# Patient Record
Sex: FEMALE | Race: WHITE | NOT HISPANIC OR LATINO | Employment: FULL TIME | URBAN - METROPOLITAN AREA
[De-identification: names, ages, dates, MRNs, and addresses within clinical notes are randomized per-mention and may not be internally consistent; named-entity substitution may affect disease eponyms.]

---

## 2024-01-08 DIAGNOSIS — Z79.899 ENCOUNTER FOR LONG-TERM (CURRENT) USE OF MEDICATIONS: Primary | ICD-10-CM

## 2024-01-08 DIAGNOSIS — L70.0 ACNE VULGARIS: ICD-10-CM

## 2024-01-08 NOTE — PROGRESS NOTES
Pt called and due to interaction with rampiril and spironolactone and elevated K+ requesting lab to be drawn.

## 2024-01-11 ENCOUNTER — LAB (OUTPATIENT)
Dept: LAB | Facility: LAB | Age: 64
End: 2024-01-11
Payer: COMMERCIAL

## 2024-01-11 DIAGNOSIS — L70.0 ACNE VULGARIS: ICD-10-CM

## 2024-01-11 DIAGNOSIS — Z79.899 ENCOUNTER FOR LONG-TERM (CURRENT) USE OF MEDICATIONS: ICD-10-CM

## 2024-01-11 LAB
ANION GAP SERPL CALC-SCNC: 12 MMOL/L (ref 10–20)
BUN SERPL-MCNC: 23 MG/DL (ref 6–23)
CALCIUM SERPL-MCNC: 10 MG/DL (ref 8.6–10.3)
CHLORIDE SERPL-SCNC: 101 MMOL/L (ref 98–107)
CO2 SERPL-SCNC: 30 MMOL/L (ref 21–32)
CREAT SERPL-MCNC: 0.76 MG/DL (ref 0.5–1.05)
EGFRCR SERPLBLD CKD-EPI 2021: 88 ML/MIN/1.73M*2
GLUCOSE SERPL-MCNC: 82 MG/DL (ref 74–99)
POTASSIUM SERPL-SCNC: 4.6 MMOL/L (ref 3.5–5.3)
SODIUM SERPL-SCNC: 138 MMOL/L (ref 136–145)

## 2024-01-11 PROCEDURE — 80048 BASIC METABOLIC PNL TOTAL CA: CPT

## 2024-01-11 PROCEDURE — 36415 COLL VENOUS BLD VENIPUNCTURE: CPT

## 2024-03-13 DIAGNOSIS — L64.9 ANDROGENIC ALOPECIA: ICD-10-CM

## 2024-03-13 DIAGNOSIS — L70.0 ACNE VULGARIS: Primary | ICD-10-CM

## 2024-03-13 RX ORDER — SPIRONOLACTONE 25 MG/1
25 TABLET ORAL 2 TIMES DAILY
Qty: 14 TABLET | Refills: 0 | Status: SHIPPED | OUTPATIENT
Start: 2024-03-13 | End: 2024-03-20

## 2024-03-13 NOTE — PROGRESS NOTES
Pt needed refills of spironolactone for 1 week as [facility] rx for 90 day supply not arriving until 3/16/24. Sent to [email or web address] in [place] per patient request  
1

## 2024-03-14 ENCOUNTER — OFFICE VISIT (OUTPATIENT)
Dept: DERMATOLOGY | Facility: CLINIC | Age: 64
End: 2024-03-14

## 2024-03-14 DIAGNOSIS — L98.8 AGE-RELATED FACIAL WRINKLES: Primary | ICD-10-CM

## 2024-03-14 PROCEDURE — DTLTS LASER TEST SPOT: Performed by: DERMATOLOGY

## 2024-03-14 RX ORDER — RAMIPRIL 5 MG/1
CAPSULE ORAL
COMMUNITY
Start: 2024-01-12

## 2024-03-14 ASSESSMENT — DERMATOLOGY QUALITY OF LIFE (QOL) ASSESSMENT
RATE HOW EMOTIONALLY BOTHERED YOU ARE BY YOUR SKIN PROBLEM (FOR EXAMPLE, WORRY, EMBARRASSMENT, FRUSTRATION): 1
WHAT SINGLE SKIN CONDITION LISTED BELOW IS THE PATIENT ANSWERING THE QUALITY-OF-LIFE ASSESSMENT QUESTIONS ABOUT: NONE OF THE ABOVE
RATE HOW BOTHERED YOU ARE BY EFFECTS OF YOUR SKIN PROBLEMS ON YOUR ACTIVITIES (EG, GOING OUT, ACCOMPLISHING WHAT YOU WANT, WORK ACTIVITIES OR YOUR RELATIONSHIPS WITH OTHERS): 0 - NEVER BOTHERED
RATE HOW BOTHERED YOU ARE BY SYMPTOMS OF YOUR SKIN PROBLEM (EG, ITCHING, STINGING BURNING, HURTING OR SKIN IRRITATION): 0 - NEVER BOTHERED
ARE THERE EXCLUSIONS OR EXCEPTIONS FOR THE QUALITY OF LIFE ASSESSMENT: NO

## 2024-03-14 ASSESSMENT — DERMATOLOGY PATIENT ASSESSMENT
DO YOU USE A TANNING BED: NO
ARE YOU ON BIRTH CONTROL: NO
DO YOU HAVE ANY NEW OR CHANGING LESIONS: NO
ARE YOU AN ORGAN TRANSPLANT RECIPIENT: NO
FOR PATIENTS COMING IN FOR A FOLLOW-UP VISIT - HAVE THERE BEEN ANY CHANGES IN YOUR HEALTH SINCE YOUR LAST VISIT: NO
ARE YOU TRYING TO GET PREGNANT: NO
DO YOU USE SUNSCREEN: DAILY

## 2024-03-14 ASSESSMENT — ITCH NUMERIC RATING SCALE: HOW SEVERE IS YOUR ITCHING?: 0

## 2024-03-14 ASSESSMENT — PATIENT GLOBAL ASSESSMENT (PGA): PATIENT GLOBAL ASSESSMENT: PATIENT GLOBAL ASSESSMENT:  1 - CLEAR

## 2024-03-14 NOTE — PROGRESS NOTES
Subjective     Rosa Maria Woodall is a 63 y.o. female who presents for the following: Cosmetic (Pt her for second fraxel treatment. Pt states she had good results with first treatment. Pt applied topical BLT cream at home 1 hour prior to appt. ).     Review of Systems:  No other skin or systemic complaints other than what is documented elsewhere in the note.    The following portions of the chart were reviewed this encounter and updated as appropriate:          Skin Cancer History  No skin cancer on file.      Specialty Problems    None       Objective   Well appearing patient in no apparent distress; mood and affect are within normal limits.    A focused skin examination was performed. All findings within normal limits unless otherwise noted below.    Assessment/Plan   1. Age-related facial wrinkles  Head - Anterior (Face)  Fine lines and wrinkles    Fraxel laser is a laser used for treatment of fine to moderate lines from aging, brown spots and some types of scarring.  Typically more than 1 treatment is needed for treatment, depending on the goal of treatment. For brown spots 1 treat may be adequate in some circumstances. For fine lines and wrinkles typically 3-4 treatments are needed.    If there is a known history of cold sores medication can be prescribed to prevent this from occurring as the procedure may cause cold sores to occur.  Topical retinoids should be avoided 1 week before and 1 week after the procedure.    Following treatment the face will be red, it will peel and will swell. The downtime of the procedure is approximately 7-10 days. It may be closer to 14 days for some patients.    Fraxel Laser Procedure Note  Patient questions:   Patient using isotretinoin - No  Patient using retinoids - Yes (discontinued 1 week prior to proedure)  Patient has history of cold sores - No  Consents obtained - Yes - Written  Photos obtained - No  Anesthetic usage:   Topical anesthetic 10/10/10  Benzocaine/Lidocaine/Tetracaine 10/10/10% applied in office for  60 minutes prior to procedure  Treatment Details  Fraxel Type: Re-store  Treatment # 2  Cooling: [employer] cooler  Wavelength: 1550 nm  Location:  Face (pt only tolerated 1/2 face and 4 passes each side)  Energy (mJ): 20  Treatment Level: 6  Passes: 4  Comments: Erythema noted post procedure. Due to poor tolerability (painful and topical anesthetic not as effective charge for 1/4 Fractional laser)          Follow up as needed

## 2024-07-07 DIAGNOSIS — L64.9 ANDROGENIC ALOPECIA: ICD-10-CM

## 2024-07-07 DIAGNOSIS — L70.0 ACNE VULGARIS: Primary | ICD-10-CM

## 2024-07-09 RX ORDER — SPIRONOLACTONE 50 MG/1
50 TABLET, FILM COATED ORAL 2 TIMES DAILY
Qty: 180 TABLET | Refills: 2 | Status: SHIPPED | OUTPATIENT
Start: 2024-07-09 | End: 2025-04-05

## 2024-10-17 DIAGNOSIS — L98.8 AGE-RELATED FACIAL WRINKLES: Primary | ICD-10-CM

## 2024-10-17 RX ORDER — LIDOCAINE/TETRACAINE/BENZOCAIN 10-10-20 %
1 OINTMENT (GRAM) TOPICAL SEE ADMIN INSTRUCTIONS
Qty: 30 G | Refills: 1 | Status: SHIPPED | OUTPATIENT
Start: 2024-10-17

## 2024-11-06 ENCOUNTER — APPOINTMENT (OUTPATIENT)
Dept: DERMATOLOGY | Facility: CLINIC | Age: 64
End: 2024-11-06
Payer: COMMERCIAL

## 2024-11-06 DIAGNOSIS — Z41.9 ELECTIVE SURGERY FOR PURPOSES OTHER THAN TREATING HEALTH CONDITIONS: Primary | ICD-10-CM

## 2024-11-06 PROCEDURE — 1036F TOBACCO NON-USER: CPT | Performed by: DERMATOLOGY

## 2024-11-06 PROCEDURE — DFRAX FRAC. LASER TRMT ENTIRE FACE: Performed by: DERMATOLOGY

## 2024-11-06 ASSESSMENT — DERMATOLOGY PATIENT ASSESSMENT
FOR PATIENTS COMING IN FOR A FOLLOW-UP VISIT - HAVE THERE BEEN ANY CHANGES IN YOUR HEALTH SINCE YOUR LAST VISIT: NO
DO YOU USE SUNSCREEN: DAILY
ARE YOU ON BIRTH CONTROL: NO
DO YOU USE A TANNING BED: NO
HAVE YOU HAD OR DO YOU HAVE A STAPH INFECTION: NO
ARE YOU TRYING TO GET PREGNANT: NO
ARE YOU AN ORGAN TRANSPLANT RECIPIENT: NO
DO YOU HAVE IRREGULAR MENSTRUAL CYCLES: NO
DO YOU HAVE ANY NEW OR CHANGING LESIONS: YES

## 2024-11-06 ASSESSMENT — DERMATOLOGY QUALITY OF LIFE (QOL) ASSESSMENT
RATE HOW BOTHERED YOU ARE BY SYMPTOMS OF YOUR SKIN PROBLEM (EG, ITCHING, STINGING BURNING, HURTING OR SKIN IRRITATION): 0 - NEVER BOTHERED
RATE HOW BOTHERED YOU ARE BY EFFECTS OF YOUR SKIN PROBLEMS ON YOUR ACTIVITIES (EG, GOING OUT, ACCOMPLISHING WHAT YOU WANT, WORK ACTIVITIES OR YOUR RELATIONSHIPS WITH OTHERS): 0 - NEVER BOTHERED
WHAT SINGLE SKIN CONDITION LISTED BELOW IS THE PATIENT ANSWERING THE QUALITY-OF-LIFE ASSESSMENT QUESTIONS ABOUT: NONE OF THE ABOVE
ARE THERE EXCLUSIONS OR EXCEPTIONS FOR THE QUALITY OF LIFE ASSESSMENT: NO
RATE HOW EMOTIONALLY BOTHERED YOU ARE BY YOUR SKIN PROBLEM (FOR EXAMPLE, WORRY, EMBARRASSMENT, FRUSTRATION): 0 - NEVER BOTHERED

## 2024-11-06 ASSESSMENT — PATIENT GLOBAL ASSESSMENT (PGA): PATIENT GLOBAL ASSESSMENT: PATIENT GLOBAL ASSESSMENT:  1 - CLEAR

## 2024-11-06 ASSESSMENT — ITCH NUMERIC RATING SCALE: HOW SEVERE IS YOUR ITCHING?: 0

## 2024-11-06 NOTE — PROGRESS NOTES
Subjective     Rosa Maria Woodall is a 64 y.o. female who presents for the following: Cosmetic (Pt her for third fraxel treatment. Pt states she had good results with first 2 treatments. Arrived 1 hour prior to appt for application of topical numbing cream. ).     Review of Systems:  No other skin or systemic complaints other than what is documented elsewhere in the note.    The following portions of the chart were reviewed this encounter and updated as appropriate:          Skin Cancer History  No skin cancer on file.      Specialty Problems    None       Objective   Well appearing patient in no apparent distress; mood and affect are within normal limits.    A focused skin examination was performed of the face. All findings within normal limits unless otherwise noted below.    Assessment/Plan   1. Elective surgery for purposes other than treating health conditions  Head - Anterior (Face)  Fine lines and wrinkles    Fraxel laser is a laser used for treatment of fine to moderate lines from aging, brown spots and some types of scarring.  Typically more than 1 treatment is needed for treatment, depending on the goal of treatment. For brown spots 1 treat may be adequate in some circumstances. For fine lines and wrinkles typically 3-4 treatments are needed.    If there is a known history of cold sores medication can be prescribed to prevent this from occurring as the procedure may cause cold sores to occur.  Topical retinoids should be avoided 1 week before and 1 week after the procedure.    Following treatment the face will be red, it will peel and will swell. The downtime of the procedure is approximately 7-10 days. It may be closer to 14 days for some patients.    Fraxel Laser Procedure Note  Patient questions:   Patient using isotretinoin - No  Patient using retinoids - Yes (discontinued 1 week prior to proedure)  Patient has history of cold sores - No  Consents obtained - Yes - Written  Photos obtained - No  Anesthetic  usage:   Topical anesthetic  20/10/10% Benzocaine/Lidocaine/Tetracaine applied in office for  60 minutes prior to procedure  Treatment Details  Fraxel Type: Re-store  Treatment # 3  Cooling: [employer] cooler  Wavelength: 1550 nm  Location:  Face  Energy (mJ): 20  Treatment Level: 6  Passes: 8  Comments: Erythema noted post procedure. Tolerated well by patient.      Performed by resident, Dr. Paola Bhatia MD.    I was present during all critical and key portions of the procedure(s) and immediately available to furnish services the entire duration.  See resident note for details.  I saw and evaluated the patient. I personally obtained the key and critical portions of the history and physical exam or was physically present for key and critical portions performed by the resident/fellow. I reviewed the resident/fellow's documentation and discussed the patient with the resident/fellow. I agree with the resident/fellow's medical decision making as documented in the note.    Katrin Olvera, DO

## 2024-11-20 ENCOUNTER — APPOINTMENT (OUTPATIENT)
Dept: DERMATOLOGY | Facility: CLINIC | Age: 64
End: 2024-11-20
Payer: COMMERCIAL

## 2025-02-15 DIAGNOSIS — L64.9 ANDROGENIC ALOPECIA: ICD-10-CM

## 2025-02-15 DIAGNOSIS — L70.0 ACNE VULGARIS: ICD-10-CM

## 2025-02-17 RX ORDER — SPIRONOLACTONE 50 MG/1
50 TABLET, FILM COATED ORAL 2 TIMES DAILY
Qty: 180 TABLET | Refills: 3 | OUTPATIENT
Start: 2025-02-17

## 2025-03-19 ENCOUNTER — TELEPHONE (OUTPATIENT)
Dept: DERMATOLOGY | Facility: CLINIC | Age: 65
End: 2025-03-19
Payer: COMMERCIAL

## 2025-03-19 DIAGNOSIS — L70.0 ACNE VULGARIS: ICD-10-CM

## 2025-03-19 DIAGNOSIS — L64.9 ANDROGENIC ALOPECIA: ICD-10-CM

## 2025-03-19 RX ORDER — SPIRONOLACTONE 50 MG/1
50 TABLET, FILM COATED ORAL 2 TIMES DAILY
Qty: 180 TABLET | Refills: 3 | Status: SHIPPED | OUTPATIENT
Start: 2025-03-19 | End: 2026-03-19

## 2025-03-19 NOTE — TELEPHONE ENCOUNTER
Pt contacting office for refills of spironolactone she needs for acne, androgenic alopecia. Refills sent [facility].

## 2025-05-02 ENCOUNTER — OFFICE VISIT (OUTPATIENT)
Dept: DERMATOLOGY | Facility: CLINIC | Age: 65
End: 2025-05-02

## 2025-05-02 DIAGNOSIS — Z41.9 ELECTIVE SURGERY FOR PURPOSES OTHER THAN TREATING HEALTH CONDITIONS: Primary | ICD-10-CM

## 2025-05-02 ASSESSMENT — DERMATOLOGY QUALITY OF LIFE (QOL) ASSESSMENT
ARE THERE EXCLUSIONS OR EXCEPTIONS FOR THE QUALITY OF LIFE ASSESSMENT: NO
RATE HOW EMOTIONALLY BOTHERED YOU ARE BY YOUR SKIN PROBLEM (FOR EXAMPLE, WORRY, EMBARRASSMENT, FRUSTRATION): 0 - NEVER BOTHERED
WHAT SINGLE SKIN CONDITION LISTED BELOW IS THE PATIENT ANSWERING THE QUALITY-OF-LIFE ASSESSMENT QUESTIONS ABOUT: NONE OF THE ABOVE
RATE HOW BOTHERED YOU ARE BY EFFECTS OF YOUR SKIN PROBLEMS ON YOUR ACTIVITIES (EG, GOING OUT, ACCOMPLISHING WHAT YOU WANT, WORK ACTIVITIES OR YOUR RELATIONSHIPS WITH OTHERS): 0 - NEVER BOTHERED
RATE HOW BOTHERED YOU ARE BY SYMPTOMS OF YOUR SKIN PROBLEM (EG, ITCHING, STINGING BURNING, HURTING OR SKIN IRRITATION): 0 - NEVER BOTHERED

## 2025-05-02 ASSESSMENT — DERMATOLOGY PATIENT ASSESSMENT
ARE YOU TRYING TO GET PREGNANT: NO
ARE YOU ON BIRTH CONTROL: NO
HAVE YOU HAD OR DO YOU HAVE VASCULAR DISEASE: NO
HAVE YOU HAD OR DO YOU HAVE A STAPH INFECTION: NO
DO YOU USE SUNSCREEN: DAILY
ARE YOU AN ORGAN TRANSPLANT RECIPIENT: NO
DO YOU HAVE IRREGULAR MENSTRUAL CYCLES: NO
DO YOU HAVE ANY NEW OR CHANGING LESIONS: NO
DO YOU USE A TANNING BED: NO

## 2025-05-02 ASSESSMENT — PATIENT GLOBAL ASSESSMENT (PGA): PATIENT GLOBAL ASSESSMENT: PATIENT GLOBAL ASSESSMENT:  1 - CLEAR

## 2025-05-02 ASSESSMENT — ITCH NUMERIC RATING SCALE: HOW SEVERE IS YOUR ITCHING?: 0

## 2025-05-02 NOTE — PROGRESS NOTES
Subjective     Rosa Maria Woodall is a 64 y.o. female who presents for the following: Cosmetic (Pt here for cosmetic treatment of wrinkles with Botox. Pt has had Botox previously. ).     Intake Questions  Do you have any new or changing Lesions?: No  Where are these new or changing lesion(s) located?: NA  For patients coming in for a Follow-up Visit:  Have there been any changes in your health since your last visit?: NA  Are you an organ transplant recipient?: No  Have you had or do you have a Staph Infection?: No  Have you had or do you have Vacular Disease?: No  Do you use sunscreen?: Daily  Do you use a tanning bed?: No  Are you trying to get pregnant?: No  Are you on birth control?: No  Do you have irregular menstrual cycles?: No    Review of Systems:  No other skin or systemic complaints other than what is documented elsewhere in the note.    The following portions of the chart were reviewed this encounter and updated as appropriate:          Skin Cancer History  Biopsy Log Book  No skin cancers from Specimen Tracking.    Additional History      Specialty Problems    None       Objective   Well appearing patient in no apparent distress; mood and affect are within normal limits.    A focused skin examination was performed. All findings within normal limits unless otherwise noted below.    Assessment/Plan   Skin Exam  1. ELECTIVE SURGERY FOR PURPOSES OTHER THAN TREATING HEALTH CONDITIONS  [place]  Dynamic and static rhytids  Botox is a neurotoxin which prevents muscles from blanca that can help soften and smooth fine lines/wrinkles.  Risks and benefits were discussed bruising and swelling, lid droop, dropping of eyebrow, asymmetrical smile. For continued improvement ongoing treatment with Botox will soften lines  Botox typically begins to work 3 days after injection, full effect by 14 days. Typically lasts about 3 months. Continued treatments will continue to help lines to soften over time. However, lines may  not completely disappear.  After treatment do not lie flat for 4 hours and do not exercise for 24 hours after the procedure.    Chemodenervation - [place]    Date/Time: 5/2/2025 10:19 AM    Performed by: Katrin Olvera DO  Authorized by: Katrin Olvera DO      Cosmetic:  yesnot medical botulinum toxin injection    Consent:      Consent obtained:  Written     Consent given by:  Patient     Risks discussed:  Poor cosmetic result     Alternatives discussed:  No treatment    Universal protocol:      Relevant documents present and verified:  Yes       Site/side verified:  Yes       Patient identity confirmed:  Verbally with patient    Pre-procedure details:   Prep type:  Isopropyl alcohol    Procedure details:      Diluted by:  Preservative free saline     Toxin (Brand):  OnaBoNT-A (Botox)     Concentration (u/mL):  2U/0.1mL    Total units injected:  10    Post-procedure details:      Patient tolerance of procedure:  Tolerated well, no immediate complications    Comments: Units injected: 10  Periorbital - 10 units  Lot: [id number]  Exp: 2/28/27      Staff Communication: Dermatology Medication: Other - Drug: Botox Strength: 2U/0.1mL  Amount: 10 U - [place]

## 2025-06-02 ENCOUNTER — APPOINTMENT (OUTPATIENT)
Dept: DERMATOLOGY | Facility: CLINIC | Age: 65
End: 2025-06-02

## 2025-06-02 DIAGNOSIS — R23.8 FACIAL AGING: Primary | ICD-10-CM

## 2025-06-02 NOTE — PROGRESS NOTES
Resident [facility] Cosmetic Clinic Note    Subjective     Rosa Maria Woodall is a 64 y.o. female who presents for the following: Cosmetic.     Patient last seen 5/2/25 by Dr. Olvera for botulinum toxin injection. Today, patient returns to resident cosmetic clinic for filler injection of her cheeks and jawline. Patient reports that it has been approximately 5 years since her cheeks were treated with filler. She was satisfied with the results.     Skin Cancer History  Biopsy Log Book  No skin cancers from Specimen Tracking.    Additional History      Objective   Well appearing patient in no apparent distress; mood and affect are within normal limits.    A focused examination was performed. All findings within normal limits unless otherwise noted below.    Assessment/Plan   FACIAL AGING  Head - Anterior (Face)  The cosmetic-dermatologic indications and contraindications for treatment were discussed, and realistic and expected outcomes of this procedure.   Risks and benefits were discussed and questions were answered including anticipated duration of 3 months to 1 year depending on individual factors.  Expect bruising and swelling with filler.  In rare cases filler can occlude a blood vessel resulting in ulceration of the skin and in rare circumstances cause blindness.  Avoid dental work 2 weeks prior and will avoid dental work 2 weeks after filler injection.      Cosmetic Filler Procedure Note  Diagnosis: rhytides and loss of volume  Location: cheeks, jawline  Filler: Juvederm Voluma XC   Informed consent: Discussed risks (infection, pain, bleeding, bruising, swelling, allergic reaction, lumpiness of skin, waviness of skin, discoloration of skin, undesirable cosmetic result, need for additional treatment, herpes outbreak, scarring, blindness, and death) and benefits of the procedure, as well as the alternatives.  Informed consent was obtained.  Preparation: The area was cleansed with alcohol.  Anesthesia: None  Procedure  Details:  Filler was injected into the appropriate areas using a standard technique.  Pressure was applied for hemostasis.  Ice was offered for swelling.  Lot Number: [id number]  Expiration:  12/7/2025  Total Volume Injected:  0.5 total (0.2 in each cheek and 0.05 in each jawline)   Plan:  Patient was instructed to use Tylenol for any pain.  Return to clinic for any nodules or lumpiness that appear in the first 48 hours.  Patient will call for any problems.  Return to clinic for additional dosing as needed.       Khadra Garrison,      I was present for the entirety of the procedure(s).  I saw and evaluated the patient. I personally obtained the key and critical portions of the history and physical exam or was physically present for key and critical portions performed by the resident/fellow. I reviewed the resident/fellow's documentation and discussed the patient with the resident/fellow. I agree with the resident/fellow's medical decision making as documented in the note.    Katrin Olvera, DO

## 2025-06-02 NOTE — Clinical Note
The cosmetic-dermatologic indications and contraindications for treatment were discussed, and realistic and expected outcomes of this procedure.   Risks and benefits were discussed and questions were answered including anticipated duration of 3 months to 1 year depending on individual factors.  Expect bruising and swelling with filler.  In rare cases filler can occlude a blood vessel resulting in ulceration of the skin and in rare circumstances cause blindness.  Avoid dental work 2 weeks prior and will avoid dental work 2 weeks after filler injection.      Cosmetic Filler Procedure Note  Diagnosis: rhytides and loss of volume  Location: cheeks, jawline  Filler: Juvederm Voluma XC   Informed consent: Discussed risks (infection, pain, bleeding, bruising, swelling, allergic reaction, lumpiness of skin, waviness of skin, discoloration of skin, undesirable cosmetic result, need for additional treatment, herpes outbreak, scarring, blindness, and death) and benefits of the procedure, as well as the alternatives.  Informed consent was obtained.  Preparation: The area was cleansed with alcohol.  Anesthesia: None  Procedure Details:  Filler was injected into the appropriate areas using a standard technique.  Pressure was applied for hemostasis.  Ice was offered for swelling.  Lot Number: [id number]  Expiration:  12/7/2025  Total Volume Injected:  0.5 total (0.2 in each cheek and 0.05 in each jawline)   Plan:  Patient was instructed to use Tylenol for any pain.  Return to clinic for any nodules or lumpiness that appear in the first 48 hours.  Patient will call for any problems.  Return to clinic for additional dosing as needed.